# Patient Record
Sex: FEMALE | Race: WHITE | NOT HISPANIC OR LATINO | Employment: OTHER | URBAN - METROPOLITAN AREA
[De-identification: names, ages, dates, MRNs, and addresses within clinical notes are randomized per-mention and may not be internally consistent; named-entity substitution may affect disease eponyms.]

---

## 2017-05-09 ENCOUNTER — LAB REQUISITION (OUTPATIENT)
Dept: LAB | Facility: HOSPITAL | Age: 82
End: 2017-05-09
Payer: COMMERCIAL

## 2017-05-09 DIAGNOSIS — L03.221 CELLULITIS OF NECK: ICD-10-CM

## 2017-05-09 PROCEDURE — 88173 CYTOPATH EVAL FNA REPORT: CPT | Performed by: OTOLARYNGOLOGY

## 2017-05-09 PROCEDURE — 87076 CULTURE ANAEROBE IDENT EACH: CPT | Performed by: OTOLARYNGOLOGY

## 2017-05-09 PROCEDURE — 88305 TISSUE EXAM BY PATHOLOGIST: CPT | Performed by: OTOLARYNGOLOGY

## 2017-05-09 PROCEDURE — 87070 CULTURE OTHR SPECIMN AEROBIC: CPT | Performed by: OTOLARYNGOLOGY

## 2017-05-09 PROCEDURE — 88172 CYTP DX EVAL FNA 1ST EA SITE: CPT | Performed by: OTOLARYNGOLOGY

## 2017-05-09 PROCEDURE — 87075 CULTR BACTERIA EXCEPT BLOOD: CPT | Performed by: OTOLARYNGOLOGY

## 2017-05-09 PROCEDURE — 87185 SC STD ENZYME DETCJ PER NZM: CPT | Performed by: OTOLARYNGOLOGY

## 2017-05-09 PROCEDURE — 87147 CULTURE TYPE IMMUNOLOGIC: CPT | Performed by: OTOLARYNGOLOGY

## 2017-05-09 PROCEDURE — 87205 SMEAR GRAM STAIN: CPT | Performed by: OTOLARYNGOLOGY

## 2017-05-10 ENCOUNTER — LAB REQUISITION (OUTPATIENT)
Dept: LAB | Facility: HOSPITAL | Age: 82
End: 2017-05-10
Payer: COMMERCIAL

## 2017-05-10 DIAGNOSIS — L03.221 CELLULITIS OF NECK: ICD-10-CM

## 2017-05-15 LAB
BACTERIA WND AEROBE CULT: NORMAL
BACTERIA WND AEROBE CULT: NORMAL
GRAM STN SPEC: NORMAL

## 2017-05-19 LAB
BACTERIA SPEC ANAEROBE CULT: NORMAL

## 2018-05-15 ENCOUNTER — TRANSCRIBE ORDERS (OUTPATIENT)
Dept: OBGYN CLINIC | Facility: MEDICAL CENTER | Age: 83
End: 2018-05-15

## 2018-05-16 ENCOUNTER — OFFICE VISIT (OUTPATIENT)
Dept: OBGYN CLINIC | Facility: MEDICAL CENTER | Age: 83
End: 2018-05-16
Payer: COMMERCIAL

## 2018-05-16 ENCOUNTER — APPOINTMENT (OUTPATIENT)
Dept: RADIOLOGY | Facility: CLINIC | Age: 83
End: 2018-05-16
Payer: COMMERCIAL

## 2018-05-16 VITALS
DIASTOLIC BLOOD PRESSURE: 81 MMHG | SYSTOLIC BLOOD PRESSURE: 124 MMHG | HEART RATE: 74 BPM | HEIGHT: 59 IN | BODY MASS INDEX: 30.52 KG/M2 | WEIGHT: 151.4 LBS

## 2018-05-16 DIAGNOSIS — M17.12 OSTEOARTHRITIS OF LEFT KNEE, UNSPECIFIED OSTEOARTHRITIS TYPE: Primary | ICD-10-CM

## 2018-05-16 DIAGNOSIS — M25.562 LEFT KNEE PAIN, UNSPECIFIED CHRONICITY: ICD-10-CM

## 2018-05-16 DIAGNOSIS — Z01.89 ENCOUNTER FOR LOWER EXTREMITY COMPARISON IMAGING STUDY: ICD-10-CM

## 2018-05-16 PROCEDURE — 73564 X-RAY EXAM KNEE 4 OR MORE: CPT

## 2018-05-16 PROCEDURE — 73560 X-RAY EXAM OF KNEE 1 OR 2: CPT

## 2018-05-16 PROCEDURE — 99203 OFFICE O/P NEW LOW 30 MIN: CPT | Performed by: ORTHOPAEDIC SURGERY

## 2018-05-16 PROCEDURE — 20610 DRAIN/INJ JOINT/BURSA W/O US: CPT | Performed by: ORTHOPAEDIC SURGERY

## 2018-05-16 RX ADMIN — BUPIVACAINE HYDROCHLORIDE 2 ML: 2.5 INJECTION, SOLUTION INFILTRATION; PERINEURAL at 13:03

## 2018-05-16 RX ADMIN — METHYLPREDNISOLONE ACETATE 2 ML: 40 INJECTION, SUSPENSION INTRA-ARTICULAR; INTRALESIONAL; INTRAMUSCULAR; SOFT TISSUE at 13:03

## 2018-05-16 NOTE — PROGRESS NOTES
Assessment/Plan     1  Osteoarthritis of left knee, unspecified osteoarthritis type    2  Left knee pain, unspecified chronicity    3  Encounter for lower extremity comparison imaging study      Orders Placed This Encounter   Procedures    Large joint arthrocentesis    Medial  Knee Brace    XR knee 4+ vw left injury    XR knee 1 or 2 vw right    Ambulatory Referral to Home Health       81year old female  with severe left knee osteoarthritis  Explained to the pt that arthroplasty may be a future treatment option for her, however at this time more conservative treatment would be preferred which she agrees with  She received a CSI in the left knee which she tolerated well with no immediate complication  She was also offered a referral for home PT, voltaren gel to use as needed, and a knee brace to wear for comfort  She will follow up in six weeks or as needed  Return in about 6 weeks (around 6/27/2018)  I answered all of the patient's questions during the visit and provided education of the patient's condition during the visit  The patient verbalized understanding of the information given and agrees with the plan  This note was dictated using [employer] software  It may contain errors including improperly dictated words  Please contact physician directly for any questions  History of Present Illness   Chief complaint:   Chief Complaint   Patient presents with    Left Knee - Pain       HPI: Alvera Najjar is a 81 y o  female that c/o left knee pain  Pain in her left knee has been chronic for over 10 years, getting worse over past 5 years, especially the last year  She reports having injections in this knee over ten years ago in [place], can't remember whether they were HA or CSI  Pain is 8-9/10, located on anteromedial knee, it is burning and sharp,intermittent at times, other times constant   Pain does not radiate Worse with walking and being on her feet, better with tylenol (can't take NSAIDs due to being on eliquis)  She denies any surgeries in this knee, denies any significant injuries that she is aware of, no numbness or tingling  She does note some swelling in the left knee  ROS:    See HPI for musculoskeletal review  All other systems reviewed are negative     Historical Information   Past Medical History:   Diagnosis Date    Hypertension      Past Surgical History:   Procedure Laterality Date    FOOT SURGERY      SKIN CANCER EXCISION       Social History   History   Alcohol use Not on file     History   Drug use: Unknown     History   Smoking Status    Never Smoker   Smokeless Tobacco    Never Used     Family History:   Family History   Problem Relation Age of Onset    No Known Problems Mother     No Known Problems Father        Meds/Allergies     (Not in a hospital admission)  No Known Allergies    No current outpatient prescriptions on file prior to visit  No current facility-administered medications on file prior to visit  Objective   Vitals: Blood pressure 124/81, pulse 74, height 4' 11" (1 499 m), weight 68 7 kg (151 lb 6 4 oz)  ,Body mass index is 30 58 kg/m²      PE:  AAOx 3  WDWN  Hearing intact, no drainage from eyes  Regular rate  no audible wheezing  no abdominal distension  LE compartments soft, skin intact  EHL/AT/GS intact, sensation grossly intact L4, L5, S1, palpable pedal pulse    left knee:    Appearance:  No bruising  Knee has mild varus deformity  Minimal effusion, some diffuse extraarticular swelling  Palpation/Tenderness:  +TTP over medial joint line, no TTP over lateral joint line or over patella/patellar tendon  Active Range of Motion:  AROM: full  Special Tests:  Lachman's Test:  negative  Anterior Drawer Test:  negative  Valgus Stress Test:  negative  Varus Stress Test:  negative     No ipsilateral hip pain with ROM      Imaging Studies: I have personally reviewed pertinent films in PACS   XR L knee:  Severe DJD    Large joint arthrocentesis  Date/Time: 5/16/2018 1:03 PM  Consent given by: patient  Site marked: site marked  Timeout: Immediately prior to procedure a time out was called to verify the correct patient, procedure, equipment, support staff and site/side marked as required   Supporting Documentation  Indications: pain   Procedure Details  Location: knee - L knee  Preparation: Patient was prepped and draped in the usual sterile fashion  Needle size: 22 G  Ultrasound guidance: no  Approach: anterolateral  Medications administered: 2 mL bupivacaine 0 25 %; 2 mL methylPREDNISolone acetate 40 mg/mL    Patient tolerance: patient tolerated the procedure well with no immediate complications  Dressing:  Sterile dressing applied

## 2018-05-17 RX ORDER — BUPIVACAINE HYDROCHLORIDE 2.5 MG/ML
2 INJECTION, SOLUTION INFILTRATION; PERINEURAL
Status: COMPLETED | OUTPATIENT
Start: 2018-05-16 | End: 2018-05-16

## 2018-05-17 RX ORDER — METHYLPREDNISOLONE ACETATE 40 MG/ML
2 INJECTION, SUSPENSION INTRA-ARTICULAR; INTRALESIONAL; INTRAMUSCULAR; SOFT TISSUE
Status: COMPLETED | OUTPATIENT
Start: 2018-05-16 | End: 2018-05-16

## 2018-05-18 ENCOUNTER — TELEPHONE (OUTPATIENT)
Dept: OBGYN CLINIC | Facility: HOSPITAL | Age: 83
End: 2018-05-18

## 2018-05-18 NOTE — TELEPHONE ENCOUNTER
I called and spoke to the patient  She states she is all set up for outpatient physical therapy  She does not need a script her any help than this up

## 2018-05-18 NOTE — TELEPHONE ENCOUNTER
phillip called from  PT  She states that the pt will be starting outpatient PT on 05/21/18 and not using their services   She can be reached at [phone]

## 2018-05-21 ENCOUNTER — EVALUATION (OUTPATIENT)
Dept: PHYSICAL THERAPY | Facility: CLINIC | Age: 83
End: 2018-05-21
Payer: COMMERCIAL

## 2018-05-21 DIAGNOSIS — M17.12 OSTEOARTHRITIS OF LEFT KNEE, UNSPECIFIED OSTEOARTHRITIS TYPE: ICD-10-CM

## 2018-05-21 DIAGNOSIS — M25.562 LEFT KNEE PAIN, UNSPECIFIED CHRONICITY: Primary | ICD-10-CM

## 2018-05-21 PROCEDURE — 97161 PT EVAL LOW COMPLEX 20 MIN: CPT

## 2018-05-21 PROCEDURE — G8979 MOBILITY GOAL STATUS: HCPCS

## 2018-05-21 PROCEDURE — 97110 THERAPEUTIC EXERCISES: CPT

## 2018-05-21 PROCEDURE — G8978 MOBILITY CURRENT STATUS: HCPCS

## 2018-05-21 NOTE — PROGRESS NOTES
PT Evaluation     Today's date: 2018  Patient name: Apple Vogel  : 1935  MRN: [id number]  Referring provider: Ny Mercer DO  Dx:   Encounter Diagnosis     ICD-10-CM    1  Left knee pain, unspecified chronicity M25 562    2  Osteoarthritis of left knee, unspecified osteoarthritis type M17 12                   Assessment  Impairments: abnormal gait, abnormal or restricted ROM, impaired balance, impaired physical strength, lacks appropriate home exercise program and pain with function    Assessment details: Apple Vogel is a 81 y o  female presents with L knee pain, OA  Pantera Deleon has the above listed impairments and will benefit from skilled PT to improve deficits to return to prior level of function  Apple Vogel was educated on eval findings and plan for management, knee anatomy, safe ice  HEP initiated  Romelia Cramer would benefit from skilled services to improve ROM, strength, flexibility, and function, and to decrease pain  Understanding of Dx/Px/POC: good   Prognosis: good    Goals  Impairment Goals  - Pt I with initial HEP in 1-2 visits  - Improve ROM equal to contralateral side in 4-6 weeks  - Increase strength to 5/5 in all affected areas in 4-6 week    Functional Goals  - Increase Functional Status Measure to: 56 in 4-6  weeks  - Patient will be independent with comprehensive HEP in 4-6 weeks  - Ambulation non-antalgic in 4-6 weeks  - LLE dynamic strength and balance at least 90% of RLE via single leg step test, in 4-6 wks        Plan  Patient would benefit from: skilled PT  Planned modality interventions: cryotherapy  Planned therapy interventions: patient education, stretching, therapeutic exercise, home exercise program, balance and strengthening  Frequency: 2x week  Duration in visits: 8  Duration in weeks: 4  Treatment plan discussed with: patient        Subjective Evaluation    History of Present Illness  Mechanism of injury: Pt reports haven fallen 4 times over 3 year, always landing on L knee  Tripped on objects or fell on ice identified as causes of falls  Reports chronic knee pain for several years  Received x-rays, dx with OA  Functional limitations: "I'm fine when I get up in the morning, but it gets worse as the day goes on "  Pt cares for horses, "I'm not one to sit "  Does steps in reciprocal pattern for a long time  Unable to do reciprocal pattern due to L knee pain  Pt reports that ortho has ordered her a knee brace, yet to arrive  Pain  Current pain ratin  At best pain ratin  At worst pain ratin  Location:  knee medial aspect  Quality: burning  Relieving factors: rest, ice and medications  Aggravating factors: standing, walking and stair climbing  Progression: improved (Improved since injection)    Social Support  Steps to enter house: yes  1  Stairs in house: no   Lives in: [facility]  Lives with: adult children    Employment status: not working (Retired)    Diagnostic Tests  X-ray: abnormal (OA)  Treatments  Previous treatment: injection treatment and medication  Current treatment: medication  Patient Goals  Patient goals for therapy: decreased pain, increased strength, increased motion, independence with ADLs/IADLs, return to sport/leisure activities and improved balance          Objective    Gait: no AD, mildly antalgic LLE, steady pattern    TUG 11 13 sec , no AD  Observation: L > R genu varum  B knees without erythema or ecchymosis  Mild to moderate edema B     OH squat: Fair balance, slightly increased trunk flexion at hips  Palpation: L knee TTP along medial joint line  B calves non-tender  B patellar mobility normal     Knee A/PROM:    R: 0-116 deg  L: 0-98 deg, pain at end range flexion AROM, PROM not tested  MMT: Quads: R 5/5, L 4/5 with crepitus  Hamstrings 5/5 B  Glute medius R 5/5, L 4-/5  Glute max B 3+/5  B ant tib 5/5  Flexibility: B gastroc, R hip flexors moderately tight    L hip flexors normal   B hamstrings normal   Priscilla's: R (+), L (-)      Precautions: PMH PE, HTN, skin CA    Daily Treatment Diary       Manuals 5/21/ 18                                                                Exercise Diary              Heel slide with strap 10"x 10            Strap gastroc str 20" x5            QS 10"x 10            SLR  x10            SAQ             TKE with tband             LAQ             Nu Step             TA Cx             Bridge             Clamshell L                                                                                                                                                            Modalities             Ice

## 2018-05-23 ENCOUNTER — TELEPHONE (OUTPATIENT)
Dept: OBGYN CLINIC | Facility: HOSPITAL | Age: 83
End: 2018-05-23

## 2018-05-23 NOTE — TELEPHONE ENCOUNTER
Patient is calling   [phone]  Patient sees Dr Melodie Beal    Patient is calling because the pharmacy has not been able to get approval for diclofenac sodium to date  Patient would like to know if there is anything else that she could use instead?

## 2018-05-24 ENCOUNTER — OFFICE VISIT (OUTPATIENT)
Dept: PHYSICAL THERAPY | Facility: CLINIC | Age: 83
End: 2018-05-24
Payer: COMMERCIAL

## 2018-05-24 DIAGNOSIS — M25.562 LEFT KNEE PAIN, UNSPECIFIED CHRONICITY: Primary | ICD-10-CM

## 2018-05-24 DIAGNOSIS — M17.12 OSTEOARTHRITIS OF LEFT KNEE, UNSPECIFIED OSTEOARTHRITIS TYPE: ICD-10-CM

## 2018-05-24 PROCEDURE — 97110 THERAPEUTIC EXERCISES: CPT

## 2018-05-24 NOTE — PROGRESS NOTES
Daily Note     Today's date: 2018  Patient name: Denise Reyes  : 1935  MRN: [id number]  Referring provider: Dom Torres DO  Dx:   Encounter Diagnosis     ICD-10-CM    1  Left knee pain, unspecified chronicity M25 562    2  Osteoarthritis of left knee, unspecified osteoarthritis type M17 12                   Subjective: "Doing a little bit better "  L knee pain 0/10  Objective: See treatment diary below    Precautions: PMH PE, HTN, skin CA    Daily Treatment Diary       Manuals                                                                Exercise Diary              Heel slide with strap 10"x 10 x20           Strap gastroc str 20" x5 x5           QS 10"x 10 x20           SLR  x10 2x10           SAQ  2x10           TKE with tband  blk 2x10           LAQ  2x10           Nu Step  8'           TA Cx  10"x 10           Bridge  10"x 10           Clamshell L  10"x 10                                                                                                                                                          Modalities             Ice                                        Assessment: Tolerated treatment well  Patient would benefit from continued PT  Pain free to end tx, HEP progressed  Plan: Continue per plan of care

## 2018-05-24 NOTE — TELEPHONE ENCOUNTER
I talked to the patient today and let her know to use otc medication if she can't get it at the pharmacy

## 2018-05-30 ENCOUNTER — OFFICE VISIT (OUTPATIENT)
Dept: PHYSICAL THERAPY | Facility: CLINIC | Age: 83
End: 2018-05-30
Payer: COMMERCIAL

## 2018-05-30 DIAGNOSIS — M25.562 LEFT KNEE PAIN, UNSPECIFIED CHRONICITY: Primary | ICD-10-CM

## 2018-05-30 DIAGNOSIS — M17.12 OSTEOARTHRITIS OF LEFT KNEE, UNSPECIFIED OSTEOARTHRITIS TYPE: ICD-10-CM

## 2018-05-30 PROCEDURE — 97110 THERAPEUTIC EXERCISES: CPT

## 2018-05-30 NOTE — PROGRESS NOTES
Daily Note     Today's date: 2018  Patient name: Favio Banuelos  : 1935  MRN: [id number]  Referring provider: Miriam To DO  Dx:   Encounter Diagnosis     ICD-10-CM    1  Left knee pain, unspecified chronicity M25 562    2  Osteoarthritis of left knee, unspecified osteoarthritis type M17 12                   Subjective: received L knee brace, "I like it "  L knee pain currently 0/10  Objective: See treatment diary below  To PT with hinged L knee brace  Precautions: PMH PE, HTN, skin CA    Daily Treatment Diary       Manuals                                                               Exercise Diary              Heel slide with strap 10"x 10 x20 30          Strap gastroc str 20" x5 x5 5          QS 10"x 10 x20 -          SLR  x10 2x10 3x10          SAQ  2x10 2# 2x10          TKE with tband  blk 2x10 3x10          LAQ  2x10 2# 2x10          Nu Step  8' 10'          TA Cx  10"x 10 -          Bridge  10"x 10 15          Clamshell L  10"x 10 15                                                                                                                                                         Modalities             Ice                                          Assessment: Tolerated treatment well  Patient would benefit from continued PT      Plan: Continue per plan of care

## 2018-05-31 ENCOUNTER — TELEPHONE (OUTPATIENT)
Dept: OBGYN CLINIC | Facility: HOSPITAL | Age: 83
End: 2018-05-31

## 2018-06-01 ENCOUNTER — OFFICE VISIT (OUTPATIENT)
Dept: PHYSICAL THERAPY | Facility: CLINIC | Age: 83
End: 2018-06-01
Payer: COMMERCIAL

## 2018-06-01 DIAGNOSIS — M17.12 OSTEOARTHRITIS OF LEFT KNEE, UNSPECIFIED OSTEOARTHRITIS TYPE: ICD-10-CM

## 2018-06-01 DIAGNOSIS — M25.562 LEFT KNEE PAIN, UNSPECIFIED CHRONICITY: Primary | ICD-10-CM

## 2018-06-01 PROCEDURE — 97110 THERAPEUTIC EXERCISES: CPT

## 2018-06-01 NOTE — PROGRESS NOTES
Daily Note     Today's date: 2018  Patient name: Favio Banuelos  : 1935  MRN: [id number]  Referring provider: Miriam To DO  Dx:   Encounter Diagnosis     ICD-10-CM    1  Left knee pain, unspecified chronicity M25 562    2  Osteoarthritis of left knee, unspecified osteoarthritis type M17 12                   Subjective: "I overdid it yesterday  I wanted to get my tomatoes in "  L knee pain 7/10 today after gardening yesterday  Objective: See treatment diary below    Precautions: PMH PE, HTN, skin CA    Daily Treatment Diary       Manuals                                                              Exercise Diary              Heel slide with strap 10"x 10 x20 30 30         Strap gastroc str 20" x5 x5 5 5         QS 10"x 10 x20 -          SLR  x10 2x10 3x10 3x10         SAQ  2x10 2# 2x10 3x10         TKE with tband  blk 2x10 3x10 3x10         LAQ  2x10 2# 2x10 3x10         Nu Step  8' 10' 10'         TA Cx  10"x 10 -          Bridge  10"x 10 15 20         Clamshell L  10"x 10 15 20                                                                                                                                                        Modalities             Ice                                          Assessment: Tolerated treatment well  Patient would benefit from continued PT      Plan: Continue per plan of care

## 2018-06-05 ENCOUNTER — OFFICE VISIT (OUTPATIENT)
Dept: PHYSICAL THERAPY | Facility: CLINIC | Age: 83
End: 2018-06-05
Payer: COMMERCIAL

## 2018-06-05 DIAGNOSIS — M17.12 OSTEOARTHRITIS OF LEFT KNEE, UNSPECIFIED OSTEOARTHRITIS TYPE: ICD-10-CM

## 2018-06-05 DIAGNOSIS — M25.562 LEFT KNEE PAIN, UNSPECIFIED CHRONICITY: Primary | ICD-10-CM

## 2018-06-05 PROCEDURE — 97110 THERAPEUTIC EXERCISES: CPT

## 2018-06-05 NOTE — PROGRESS NOTES
Daily Note     Today's date: 2018  Patient name: Alberta Phillip  : 1935  MRN: [id number]  Referring provider: Yazmin Ryan DO  Dx:   Encounter Diagnosis     ICD-10-CM    1  Left knee pain, unspecified chronicity M25 562    2  Osteoarthritis of left knee, unspecified osteoarthritis type M17 12                   Subjective: "The knee feels better, I don't have any pain right now "      Objective: See treatment diary below    Precautions: PMH PE, HTN, skin CA    Daily Treatment Diary       Manuals                                                             Exercise Diary              Heel slide with strap 10"x 10 x20 30 30 -        Strap gastroc str 20" x5 x5 5 5 Wall x5        QS 10"x 10 x20 -          SLR  x10 2x10 3x10 3x10 1# 2x10        SAQ  2x10 2# 2x10 3x10 3# 2x10        TKE with tband  blk 2x10 3x10 3x10 3x10        LAQ  2x10 2# 2x10 3x10 3# 2x10        Nu Step  8' 10' 10' 10'        TA Cx  10"x 10 -          Bridge  10"x 10 15 20         Clamshell L  10"x 10 15 20                                                                                                                                                        Modalities             Ice                                            Assessment: Tolerated treatment well  Patient would benefit from continued PT  HEP progressed  Plan: Continue per plan of care

## 2018-06-07 ENCOUNTER — OFFICE VISIT (OUTPATIENT)
Dept: PHYSICAL THERAPY | Facility: CLINIC | Age: 83
End: 2018-06-07
Payer: COMMERCIAL

## 2018-06-07 DIAGNOSIS — M25.562 LEFT KNEE PAIN, UNSPECIFIED CHRONICITY: Primary | ICD-10-CM

## 2018-06-07 DIAGNOSIS — M17.12 OSTEOARTHRITIS OF LEFT KNEE, UNSPECIFIED OSTEOARTHRITIS TYPE: ICD-10-CM

## 2018-06-07 PROCEDURE — 97110 THERAPEUTIC EXERCISES: CPT

## 2018-06-07 NOTE — PROGRESS NOTES
Daily Note     Today's date: 2018  Patient name: Daniel Weems  : 1935  MRN: [id number]  Referring provider: Zander Garcia DO  Dx:   Encounter Diagnosis     ICD-10-CM    1  Left knee pain, unspecified chronicity M25 562    2  Osteoarthritis of left knee, unspecified osteoarthritis type M17 12                   Subjective: L knee pain 4/10, "I cut way back on Tylenol "      Objective: See treatment diary below    Precautions: PMH PE, HTN, skin CA    Daily Treatment Diary       Manuals                                                            Exercise Diary              Heel slide with strap 10"x 10 x20 30 30 -        Strap gastroc str 20" x5 x5 5 5 Wall x5 x5       QS 10"x 10 x20 -          SLR  x10 2x10 3x10 3x10 1# 2x10 3x10       SAQ  2x10 2# 2x10 3x10 3# 2x10 3x10       TKE with tband  blk 2x10 3x10 3x10 3x10 Silver 2x10       LAQ  2x10 2# 2x10 3x10 3# 2x10 3x10       Nu Step  8' 10' 10' 10' 10'       TA Cx  10"x 10 -          Bridge  10"x 10 15 20  20       Clamshell L  10"x 10 15 20  20                                                                                                                                                      Modalities             Ice                                          Assessment: Tolerated treatment well  Patient exhibited good technique with therapeutic exercises  Therex progressed  Plan: Continue per plan of care

## 2018-06-11 ENCOUNTER — OFFICE VISIT (OUTPATIENT)
Dept: PHYSICAL THERAPY | Facility: CLINIC | Age: 83
End: 2018-06-11
Payer: COMMERCIAL

## 2018-06-11 DIAGNOSIS — M25.562 LEFT KNEE PAIN, UNSPECIFIED CHRONICITY: Primary | ICD-10-CM

## 2018-06-11 DIAGNOSIS — M17.12 OSTEOARTHRITIS OF LEFT KNEE, UNSPECIFIED OSTEOARTHRITIS TYPE: ICD-10-CM

## 2018-06-11 PROCEDURE — 97110 THERAPEUTIC EXERCISES: CPT

## 2018-06-11 NOTE — PROGRESS NOTES
Daily Note     Today's date: 2018  Patient name: Lul Waite  : 1935  MRN: [id number]  Referring provider: Luisito Cabral DO  Dx:   Encounter Diagnosis     ICD-10-CM    1  Left knee pain, unspecified chronicity M25 562    2  Osteoarthritis of left knee, unspecified osteoarthritis type M17 12                   Subjective: L knee pain 4/10, "I was standing a lot yesterday  Of course this weather doesn't help "      Objective: See treatment diary below    Precautions: PMH PE, HTN, skin CA    Daily Treatment Diary       Manuals                                                           Exercise Diary              Heel slide with strap 10"x 10 x20 30 30 -        Strap gastroc str 20" x5 x5 5 5 Wall x5 x5 x5      QS 10"x 10 x20 -          SLR  x10 2x10 3x10 3x10 1# 2x10 3x10 3x10      SAQ  2x10 2# 2x10 3x10 3# 2x10 3x10 3x10      TKE with tband  blk 2x10 3x10 3x10 3x10 Silver 2x10 3x10      LAQ  2x10 2# 2x10 3x10 3# 2x10 3x10 3x10      Nu Step  8' 10' 10' 10' 10' 10'      TA Cx  10"x 10 -          Bridge  10"x 10 15 20  20 25      Clamshell L  10"x 10 15 20  20 25                                                                                                                                                     Modalities             Ice                                  Assessment: Tolerated treatment well  Patient exhibited good technique with therapeutic exercises      Plan: Continue per plan of care

## 2018-06-12 ENCOUNTER — APPOINTMENT (OUTPATIENT)
Dept: PHYSICAL THERAPY | Facility: CLINIC | Age: 83
End: 2018-06-12
Payer: COMMERCIAL

## 2018-06-14 ENCOUNTER — OFFICE VISIT (OUTPATIENT)
Dept: PHYSICAL THERAPY | Facility: CLINIC | Age: 83
End: 2018-06-14
Payer: COMMERCIAL

## 2018-06-14 DIAGNOSIS — M17.12 OSTEOARTHRITIS OF LEFT KNEE, UNSPECIFIED OSTEOARTHRITIS TYPE: ICD-10-CM

## 2018-06-14 DIAGNOSIS — M25.562 LEFT KNEE PAIN, UNSPECIFIED CHRONICITY: Primary | ICD-10-CM

## 2018-06-14 PROCEDURE — 97110 THERAPEUTIC EXERCISES: CPT

## 2018-06-14 NOTE — PROGRESS NOTES
Daily Note     Today's date: 2018  Patient name: Rosalva Escalante  : 1935  MRN: [id number]  Referring provider: Bee Marie DO  Dx:   Encounter Diagnosis     ICD-10-CM    1  Left knee pain, unspecified chronicity M25 562    2  Osteoarthritis of left knee, unspecified osteoarthritis type M17 12                   Subjective: "Yesterday both knees were bothering me something terrible  Today they don't hurt, I don't know why "      Objective: See treatment diary below      Precautions: PMH PE, HTN, skin CA    Daily Treatment Diary       Manuals                                                          Exercise Diary              Heel slide with strap 10"x 10 x20 30 30 -        Strap gastroc str 20" x5 x5 5 5 Wall x5 x5 x5 x5     QS 10"x 10 x20 -          SLR  x10 2x10 3x10 3x10 1# 2x10 3x10 3x10 3x10     SAQ  2x10 2# 2x10 3x10 3# 2x10 3x10 3x10 3x10     TKE with tband  blk 2x10 3x10 3x10 3x10 Silver 2x10 3x10 3x10     LAQ  2x10 2# 2x10 3x10 3# 2x10 3x10 3x10 3x10     Nu Step  8' 10' 10' 10' 10' 10' 10'     TA Cx  10"x 10 -          Bridge  10"x 10 15 20  20 25 30     Clamshell L  10"x 10 15 20  20 25 30                                                                                                                                                    Modalities             Ice                                    Assessment: Tolerated treatment well  Patient exhibited good technique with therapeutic exercises and would benefit from continued PT      Plan: Continue per plan of care

## 2018-06-18 ENCOUNTER — APPOINTMENT (OUTPATIENT)
Dept: PHYSICAL THERAPY | Facility: CLINIC | Age: 83
End: 2018-06-18
Payer: COMMERCIAL

## 2018-06-19 ENCOUNTER — EVALUATION (OUTPATIENT)
Dept: PHYSICAL THERAPY | Facility: CLINIC | Age: 83
End: 2018-06-19
Payer: COMMERCIAL

## 2018-06-19 DIAGNOSIS — M17.12 OSTEOARTHRITIS OF LEFT KNEE, UNSPECIFIED OSTEOARTHRITIS TYPE: ICD-10-CM

## 2018-06-19 DIAGNOSIS — M25.562 LEFT KNEE PAIN, UNSPECIFIED CHRONICITY: Primary | ICD-10-CM

## 2018-06-19 PROCEDURE — G8979 MOBILITY GOAL STATUS: HCPCS

## 2018-06-19 PROCEDURE — 97164 PT RE-EVAL EST PLAN CARE: CPT

## 2018-06-19 PROCEDURE — G8980 MOBILITY D/C STATUS: HCPCS

## 2018-06-19 PROCEDURE — 97110 THERAPEUTIC EXERCISES: CPT

## 2018-06-19 NOTE — PROGRESS NOTES
PT Discharge    Today's date: 2018  Patient name: Jesse Franco  : 1935  MRN: [id number]  Referring provider: Mario Alberto Lundberg DO  Dx:   Encounter Diagnosis     ICD-10-CM    1  Left knee pain, unspecified chronicity M25 562    2  Osteoarthritis of left knee, unspecified osteoarthritis type M17 12                   Subjective: "The pain comes and goes "  L knee pain today 0/10  Pt independent with HEP for self management, agreeable to D/C to HEP today  Objective: See treatment diary below    Pain: L knee 0-410  Gait: no AD, non-antalgic  TUG: no AD, 10 00 sec  MMT L quads 5/5, glute medius 5/5, glute max 4/5  Knee A/PROM: L A: 1-0-113 deg, P: 2-0-113 deg  Steps: step-to pattern with rail  Dynamic strength and balance LLE 74% of RLE via single leg step test     Precautions: PMH PE, HTN, skin CA    Daily Treatment Diary       Manuals                                                         Exercise Diary              Heel slide with strap 10"x 10 x20 30 30 -        Strap gastroc str 20" x5 x5 5 5 Wall x5 x5 x5 x5 x5    QS 10"x 10 x20 -          SLR  x10 2x10 3x10 3x10 1# 2x10 3x10 3x10 3x10     SAQ  2x10 2# 2x10 3x10 3# 2x10 3x10 3x10 3x10     TKE with tband  blk 2x10 3x10 3x10 3x10 Silver 2x10 3x10 3x10 3x10    LAQ  2x10 2# 2x10 3x10 3# 2x10 3x10 3x10 3x10 3x10    Nu Step  8' 10' 10' 10' 10' 10' 10' 10'    TA Cx  10"x 10 -          Bridge  10"x 10 15 20  20 25 30 L 10"x 10    Clamshell L  10"x 10 15 20  20 25 30                                                                                                                                                    Modalities             Ice                                        Assessment: Tolerated treatment well  Patient exhibited good technique with therapeutic exercises      Jesse Franco has been compliant with attending PT and home exercise program since initial iris Sosa  has made improvements in objective data since initial evalulation  It was mutually agreed to Discharge to home exercise program at this time  Pt attended 9 visits, missed 0  Goals  Impairment Goals  - Pt I with initial HEP in 1-2 visits - met  - Improve ROM equal to contralateral side in 4-6 weeks- not met, but WFL  - Increase strength to 5/5 in all affected areas in 4-6 week - met except glute max    Functional Goals  - Increase Functional Status Measure to: 56 in 4-6  weeks - not met  - Patient will be independent with comprehensive HEP in 4-6 weeks - met  - Ambulation non-antalgic in 4-6 weeks - met  - LLE dynamic strength and balance at least 90% of RLE via single leg step test, in 4-6 wks  - not met        Plan: D/C to HEP  PT included therex, pt education, HEP, gait training

## 2018-06-20 ENCOUNTER — OFFICE VISIT (OUTPATIENT)
Dept: OBGYN CLINIC | Facility: MEDICAL CENTER | Age: 83
End: 2018-06-20
Payer: COMMERCIAL

## 2018-06-20 VITALS
SYSTOLIC BLOOD PRESSURE: 140 MMHG | DIASTOLIC BLOOD PRESSURE: 78 MMHG | HEIGHT: 59 IN | HEART RATE: 60 BPM | BODY MASS INDEX: 30 KG/M2 | WEIGHT: 148.8 LBS

## 2018-06-20 DIAGNOSIS — M17.12 PRIMARY OSTEOARTHRITIS OF LEFT KNEE: Primary | ICD-10-CM

## 2018-06-20 PROCEDURE — 99213 OFFICE O/P EST LOW 20 MIN: CPT | Performed by: ORTHOPAEDIC SURGERY

## 2018-06-20 NOTE — PROGRESS NOTES
Assessment/Plan:  1  Primary osteoarthritis of left knee      Orders Placed This Encounter   Procedures    Injection procedure prior authorization     -we will obtain Synvisc for her left knee  -we will continue with home exercises  -she will continue with Tylenol as needed for pain control  -she will continue with her knee brace as needed for comfort  Return for after synvisc   Subjective   Chief Complaint:   Chief Complaint   Patient presents with    Left Knee - Follow-up       Alberta Phillip is a 81 y o  female who presents for follow up for left knee pain  She had a left knee steroid injection on 5/16/2018  She states that helped for about a week or so  She still is having pain that is  medial and lateral   The pain comes and goes  She states the pain is worse with standing  Her knee does feel unstable at times  Physical therapy has been helping her  She was given home exercises to do on her own  She does have a knee brace which does seem to help  Review of Systems  ROS:    See HPI for musculoskeletal review     All other systems reviewed are negative     History:  Past Medical History:   Diagnosis Date    Hypertension      Past Surgical History:   Procedure Laterality Date    FOOT SURGERY      SKIN CANCER EXCISION       Social History   History   Alcohol use Not on file     History   Drug use: Unknown     History   Smoking Status    Never Smoker   Smokeless Tobacco    Never Used     Family History:   Family History   Problem Relation Age of Onset    No Known Problems Mother     No Known Problems Father        Meds/Allergies     (Not in a hospital admission)  No Known Allergies       Objective     /78   Pulse 60   Ht 4' 11" (1 499 m)   Wt 67 5 kg (148 lb 12 8 oz)   BMI 30 05 kg/m²      PE:  AAOx 3  WDWN  Hearing intact, no drainage from eyes  no audible wheezing  no abdominal distension  LE compartments soft, skin intact    Ortho Exam:  left Knee:   No erythema  no swelling  no effusion  no warmth  AROM: full  Stable to varus/valgus stress

## 2018-06-21 ENCOUNTER — APPOINTMENT (OUTPATIENT)
Dept: PHYSICAL THERAPY | Facility: CLINIC | Age: 83
End: 2018-06-21
Payer: COMMERCIAL

## 2018-06-25 ENCOUNTER — APPOINTMENT (OUTPATIENT)
Dept: PHYSICAL THERAPY | Facility: CLINIC | Age: 83
End: 2018-06-25
Payer: COMMERCIAL

## 2018-06-25 NOTE — TELEPHONE ENCOUNTER
Prior Saad Kruger has been submitted to plan via the prior auth website  Will take about 24hrs for a response by insurance  If pt calls again, case will be pending and will be contacted as soon as a response is received

## 2018-06-27 ENCOUNTER — APPOINTMENT (OUTPATIENT)
Dept: PHYSICAL THERAPY | Facility: CLINIC | Age: 83
End: 2018-06-27
Payer: COMMERCIAL

## 2018-07-30 ENCOUNTER — TELEPHONE (OUTPATIENT)
Dept: OBGYN CLINIC | Facility: MEDICAL CENTER | Age: 83
End: 2018-07-30

## 2018-07-30 NOTE — TELEPHONE ENCOUNTER
Call from patient   Call back # [phone]  Dr Cleaning Abts     Patient is requesting an update on Synvisc for the left knee

## 2018-08-03 ENCOUNTER — OFFICE VISIT (OUTPATIENT)
Dept: OBGYN CLINIC | Facility: MEDICAL CENTER | Age: 83
End: 2018-08-03
Payer: COMMERCIAL

## 2018-08-03 VITALS
HEART RATE: 68 BPM | BODY MASS INDEX: 30.04 KG/M2 | DIASTOLIC BLOOD PRESSURE: 76 MMHG | WEIGHT: 149 LBS | HEIGHT: 59 IN | SYSTOLIC BLOOD PRESSURE: 127 MMHG

## 2018-08-03 DIAGNOSIS — M17.12 PRIMARY OSTEOARTHRITIS OF LEFT KNEE: Primary | ICD-10-CM

## 2018-08-03 PROCEDURE — 20610 DRAIN/INJ JOINT/BURSA W/O US: CPT | Performed by: PHYSICIAN ASSISTANT

## 2018-08-03 RX ORDER — METHYLPREDNISOLONE 4 MG/1
TABLET ORAL
Qty: 21 TABLET | Refills: 0 | Status: SHIPPED | OUTPATIENT
Start: 2018-08-03

## 2018-08-03 NOTE — PROGRESS NOTES
Assessment/Plan:  1  Primary osteoarthritis of left knee      Orders Placed This Encounter   Procedures    Large joint arthrocentesis     -patient received a left knee Synvisc injection today  She should avoid strenuous activities rest ice her knee for 1-2 days   -continue with home exercises  -continue with Tylenol as needed for pain control  -she is given a Medrol Dosepak to use only at her granddaughter's wedding in 2 weeks if needed  Medication side effects warnings were given to her  Return in about 2 months (around 10/3/2018)  Subjective   Chief Complaint:   Chief Complaint   Patient presents with    Left Knee - Follow-up       Marisela Horton is a 81 y o  female who presents for follow up for left knee pain today  She had a steroid injection on May 16th which only helped for few weeks  Her knee pain is medial and lateral   She also has some posterior knee pain  Her knee is unstable to her  She has been doing home exercises which are helping  She does take Tylenol as needed for pain control  Her PCP recently just gave her some narcotics due to her knee flaring up yesterday She has not taken any  She does have a knee brace which is helpful  She is here today for Synvisc injection    Review of Systems  ROS:    See HPI for musculoskeletal review     All other systems reviewed are negative     History:  Past Medical History:   Diagnosis Date    Hypertension      Past Surgical History:   Procedure Laterality Date    FOOT SURGERY      SKIN CANCER EXCISION       Social History   History   Alcohol use Not on file     History   Drug use: Unknown     History   Smoking Status    Never Smoker   Smokeless Tobacco    Never Used     Family History:   Family History   Problem Relation Age of Onset    No Known Problems Mother     No Known Problems Father        Meds/Allergies     (Not in a hospital admission)  No Known Allergies       Objective     /76   Pulse 68   Ht 4' 11" (1 499 m)   Wt 67 6 kg (149 lb)   BMI 30 09 kg/m²      PE:  AAOx 3  WDWN  Hearing intact, no drainage from eyes  no audible wheezing  no abdominal distension  LE compartments soft, skin intact    Ortho Exam:  left Knee:   No erythema  no swelling  no effusion  no warmth  AROM: full  Stable to varus/valgus stress  Large joint arthrocentesis  Date/Time: 8/3/2018 12:42 PM  Consent given by: patient  Site marked: site marked  Timeout: Immediately prior to procedure a time out was called to verify the correct patient, procedure, equipment, support staff and site/side marked as required   Supporting Documentation  Indications: pain   Procedure Details  Location: knee - L knee  Needle size: 22 G  Approach: anterolateral  Medications administered: 48 mg hylan 48 MG/6ML    Patient tolerance: patient tolerated the procedure well with no immediate complications  Dressing:  Sterile dressing applied

## 2018-10-05 ENCOUNTER — APPOINTMENT (OUTPATIENT)
Dept: RADIOLOGY | Facility: CLINIC | Age: 83
End: 2018-10-05
Payer: COMMERCIAL

## 2018-10-05 ENCOUNTER — OFFICE VISIT (OUTPATIENT)
Dept: OBGYN CLINIC | Facility: MEDICAL CENTER | Age: 83
End: 2018-10-05
Payer: COMMERCIAL

## 2018-10-05 VITALS — HEIGHT: 59 IN | BODY MASS INDEX: 30.52 KG/M2 | WEIGHT: 151.4 LBS

## 2018-10-05 DIAGNOSIS — M25.561 RIGHT KNEE PAIN, UNSPECIFIED CHRONICITY: Primary | ICD-10-CM

## 2018-10-05 DIAGNOSIS — M25.561 RIGHT KNEE PAIN, UNSPECIFIED CHRONICITY: ICD-10-CM

## 2018-10-05 DIAGNOSIS — M17.11 PRIMARY OSTEOARTHRITIS OF RIGHT KNEE: ICD-10-CM

## 2018-10-05 DIAGNOSIS — M17.12 PRIMARY OSTEOARTHRITIS OF LEFT KNEE: ICD-10-CM

## 2018-10-05 PROCEDURE — 99213 OFFICE O/P EST LOW 20 MIN: CPT | Performed by: ORTHOPAEDIC SURGERY

## 2018-10-05 PROCEDURE — 73564 X-RAY EXAM KNEE 4 OR MORE: CPT

## 2018-10-05 PROCEDURE — 20610 DRAIN/INJ JOINT/BURSA W/O US: CPT | Performed by: ORTHOPAEDIC SURGERY

## 2018-10-05 RX ORDER — METHYLPREDNISOLONE ACETATE 40 MG/ML
2 INJECTION, SUSPENSION INTRA-ARTICULAR; INTRALESIONAL; INTRAMUSCULAR; SOFT TISSUE
Status: COMPLETED | OUTPATIENT
Start: 2018-10-05 | End: 2018-10-05

## 2018-10-05 RX ORDER — BUPIVACAINE HYDROCHLORIDE 2.5 MG/ML
2 INJECTION, SOLUTION INFILTRATION; PERINEURAL
Status: COMPLETED | OUTPATIENT
Start: 2018-10-05 | End: 2018-10-05

## 2018-10-05 RX ADMIN — BUPIVACAINE HYDROCHLORIDE 2 ML: 2.5 INJECTION, SOLUTION INFILTRATION; PERINEURAL at 09:00

## 2018-10-05 RX ADMIN — METHYLPREDNISOLONE ACETATE 2 ML: 40 INJECTION, SUSPENSION INTRA-ARTICULAR; INTRALESIONAL; INTRAMUSCULAR; SOFT TISSUE at 08:59

## 2018-10-05 RX ADMIN — METHYLPREDNISOLONE ACETATE 2 ML: 40 INJECTION, SUSPENSION INTRA-ARTICULAR; INTRALESIONAL; INTRAMUSCULAR; SOFT TISSUE at 09:00

## 2018-10-05 RX ADMIN — BUPIVACAINE HYDROCHLORIDE 2 ML: 2.5 INJECTION, SOLUTION INFILTRATION; PERINEURAL at 08:59

## 2018-10-05 NOTE — PROGRESS NOTES
Assessment/Plan     1  Right knee pain, unspecified chronicity    2  Primary osteoarthritis of right knee    3  Primary osteoarthritis of left knee      Orders Placed This Encounter   Procedures    Large joint arthrocentesis    Large joint arthrocentesis    XR knee 4+ vw right injury   -patient received bilateral knee steroid injections today  She should avoid strenuous activities rest ice her knee  -we will work on ordering Synvisc for her right knee today   -continue with home exercises  -continue Tylenol as needed for pain control    Return for for visco right knee after 12/5  History of Present Illness   Chief complaint:   Chief Complaint   Patient presents with    Left Knee - Pain    Right Knee - Pain       HPI: Herminio Dunham is a 81 y o  female that c/o bilateral knee pain  She had a Synvisc injection her left knee on 08/03  She states this is currently still helping her pain  She occasionally gets some pain on her anterior knee cap and her knee cracks  Her left knee does feel stable to her  Her right knee is now bothering her  Her right knee pain is posterior and medial   The pain is there most of the time  It is described as sharp stabbing pain  Her right knee does feel unstable  She has been taking Tylenol as needed for pain control  She has had no falls traumas or injuries  She has not had any injections or surgeries on her right knee  ROS:    See HPI for musculoskeletal review     All other systems reviewed are negative     Historical Information   Past Medical History:   Diagnosis Date    Hypertension      Past Surgical History:   Procedure Laterality Date    FOOT SURGERY      SKIN CANCER EXCISION       Social History   History   Alcohol use Not on file     History   Drug use: Unknown     History   Smoking Status    Never Smoker   Smokeless Tobacco    Never Used     Family History:   Family History   Problem Relation Age of Onset    No Known Problems Mother     No Known Problems Father        Meds/Allergies     (Not in a hospital admission)  No Known Allergies    Objective   Vitals: Height 4' 11" (1 499 m), weight 68 7 kg (151 lb 6 4 oz)  ,Body mass index is 30 58 kg/m²      PE:  AAOx 3  WDWN  Hearing intact, no drainage from eyes  Regular rate  no audible wheezing  no abdominal distension  LE compartments soft, skin intact  EHL/AT/GS intact, sensation grossly intact L4, L5, S1, palpable pedal pulse    bilateralknee:    Appearance:  no swelling   No bruising  no obvious joint deformity   No effusion  Palpation/Tenderness:  +TTP over medial joint line, +TTP over lateral joint line or over patella/patellar tendon  Active Range of Motion:  AROM: full  Special Tests: Right knee   Medial Yarelis's Test:  Positive  Lateral Yarelis's Test:  Negative  Apley's compression test:  Negative  Lachman's Test:  negative  Anterior Drawer Test:  negative  Valgus Stress Test:  negative  Varus Stress Test:  negative     No ipsilateral hip pain with ROM    Imaging Studies: I have personally reviewed pertinent films in PACS  X-rays right knee- severe DJD   Large joint arthrocentesis  Date/Time: 10/5/2018 8:59 AM  Consent given by: patient  Site marked: site marked  Timeout: Immediately prior to procedure a time out was called to verify the correct patient, procedure, equipment, support staff and site/side marked as required   Supporting Documentation  Indications: pain   Procedure Details  Location: knee - R knee  Needle size: 22 G  Approach: anterolateral  Medications administered: 2 mL bupivacaine 0 25 %; 2 mL methylPREDNISolone acetate 40 mg/mL    Patient tolerance: patient tolerated the procedure well with no immediate complications  Dressing:  Sterile dressing applied  Large joint arthrocentesis  Date/Time: 10/5/2018 9:00 AM  Consent given by: patient  Site marked: site marked  Timeout: Immediately prior to procedure a time out was called to verify the correct patient, procedure, equipment, support staff and site/side marked as required   Supporting Documentation  Indications: pain   Procedure Details  Location: knee - L knee  Preparation: Patient was prepped and draped in the usual sterile fashion  Needle size: 22 G  Approach: anterolateral  Medications administered: 2 mL bupivacaine 0 25 %; 2 mL methylPREDNISolone acetate 40 mg/mL    Patient tolerance: patient tolerated the procedure well with no immediate complications  Dressing:  Sterile dressing applied

## 2018-11-19 ENCOUNTER — TELEPHONE (OUTPATIENT)
Dept: OBGYN CLINIC | Facility: OTHER | Age: 83
End: 2018-11-19

## 2018-12-03 ENCOUNTER — OFFICE VISIT (OUTPATIENT)
Dept: OBGYN CLINIC | Facility: MEDICAL CENTER | Age: 83
End: 2018-12-03
Payer: COMMERCIAL

## 2018-12-03 VITALS
HEART RATE: 71 BPM | HEIGHT: 59 IN | SYSTOLIC BLOOD PRESSURE: 212 MMHG | BODY MASS INDEX: 29.84 KG/M2 | WEIGHT: 148 LBS | DIASTOLIC BLOOD PRESSURE: 79 MMHG

## 2018-12-03 DIAGNOSIS — M17.0 PRIMARY OSTEOARTHRITIS OF BOTH KNEES: Primary | ICD-10-CM

## 2018-12-03 PROBLEM — M17.10 PRIMARY OSTEOARTHRITIS OF KNEE: Status: ACTIVE | Noted: 2018-12-03

## 2018-12-03 PROCEDURE — 20610 DRAIN/INJ JOINT/BURSA W/O US: CPT | Performed by: ORTHOPAEDIC SURGERY

## 2018-12-03 NOTE — PROGRESS NOTES
Assessment/Plan:  1  Primary osteoarthritis of both knees      Orders Placed This Encounter   Procedures    Large joint arthrocentesis   ·  Patient received rate Synvisc-One injection today  She was advised to avoid strenuous activity, rest, ice as needed for the next 1-2 days  · Continue with Tylenol as needed for pain control  · Continue home exercises    Return in about 2 months (around 2/3/2019), or if symptoms worsen or fail to improve  Subjective   Chief Complaint:   Chief Complaint   Patient presents with    Right Knee - Pain, Follow-up       Nicole Celis is a 81 y o  female who presents for follow up for right knee pain  She last received a CSI in the right knee on 10/05, which she said operative minimal pain relief  She continues to describe the pain as sharp and stabbing  She takes Tylenol as needed for pain and completes home exercises daily  Denies any falls or trauma since her last visit  She states her right knee feels unstable at times  She had a Synvisc-One injection in her left knee on 8 3 which she states is significantly improved her left knee pain  Review of Systems  ROS:    See HPI for musculoskeletal review     All other systems reviewed are negative     History:  Past Medical History:   Diagnosis Date    Hypertension      Past Surgical History:   Procedure Laterality Date    FOOT SURGERY      SKIN CANCER EXCISION       Social History   History   Alcohol use Not on file     History   Drug use: Unknown     History   Smoking Status    Never Smoker   Smokeless Tobacco    Never Used     Family History:   Family History   Problem Relation Age of Onset    No Known Problems Mother     No Known Problems Father        Meds/Allergies     (Not in a hospital admission)  No Known Allergies       Objective     BP (!) 212/79   Pulse 71   Ht 4' 11" (1 499 m)   Wt 67 1 kg (148 lb)   BMI 29 89 kg/m²      PE:  AAOx 3  WDWN  Hearing intact, no drainage from eyes  no audible wheezing  no abdominal distension  LE compartments soft, skin intact    Ortho Exam:  right Knee:   No erythema  no swelling  no effusion  no warmth  AROM: full  Stable to varus/valgus stress    Large joint arthrocentesis  Date/Time: 12/3/2018 2:39 PM  Consent given by: patient  Site marked: site marked  Timeout: Immediately prior to procedure a time out was called to verify the correct patient, procedure, equipment, support staff and site/side marked as required   Supporting Documentation  Indications: pain   Procedure Details  Location: knee - R knee  Preparation: Patient was prepped and draped in the usual sterile fashion  Needle size: 22 G  Ultrasound guidance: no  Approach: anterolateral  Medications administered: 48 mg hylan 48 MG/6ML    Patient tolerance: patient tolerated the procedure well with no immediate complications  Dressing:  Sterile dressing applied

## 2019-04-17 ENCOUNTER — OFFICE VISIT (OUTPATIENT)
Dept: OBGYN CLINIC | Facility: MEDICAL CENTER | Age: 84
End: 2019-04-17
Payer: COMMERCIAL

## 2019-04-17 VITALS
WEIGHT: 157.6 LBS | BODY MASS INDEX: 31.77 KG/M2 | SYSTOLIC BLOOD PRESSURE: 134 MMHG | DIASTOLIC BLOOD PRESSURE: 68 MMHG | HEIGHT: 59 IN | HEART RATE: 64 BPM

## 2019-04-17 DIAGNOSIS — M17.11 PRIMARY OSTEOARTHRITIS OF RIGHT KNEE: ICD-10-CM

## 2019-04-17 DIAGNOSIS — M17.12 PRIMARY OSTEOARTHRITIS OF LEFT KNEE: Primary | ICD-10-CM

## 2019-04-17 PROCEDURE — 99213 OFFICE O/P EST LOW 20 MIN: CPT | Performed by: ORTHOPAEDIC SURGERY

## 2019-04-17 PROCEDURE — 20610 DRAIN/INJ JOINT/BURSA W/O US: CPT | Performed by: PHYSICIAN ASSISTANT

## 2019-04-17 RX ORDER — MULTIVIT-MIN/IRON FUM/FOLIC AC 7.5 MG-4
1 TABLET ORAL DAILY
COMMUNITY

## 2019-04-17 RX ORDER — LIDOCAINE HYDROCHLORIDE 10 MG/ML
3 INJECTION, SOLUTION INFILTRATION; PERINEURAL
Status: COMPLETED | OUTPATIENT
Start: 2019-04-17 | End: 2019-04-17

## 2019-04-17 RX ORDER — LOSARTAN POTASSIUM 100 MG/1
100 TABLET ORAL DAILY
COMMUNITY

## 2019-04-17 RX ORDER — CHLORAL HYDRATE 500 MG
1000 CAPSULE ORAL DAILY
COMMUNITY

## 2019-04-17 RX ORDER — METHYLPREDNISOLONE ACETATE 40 MG/ML
2 INJECTION, SUSPENSION INTRA-ARTICULAR; INTRALESIONAL; INTRAMUSCULAR; SOFT TISSUE
Status: COMPLETED | OUTPATIENT
Start: 2019-04-17 | End: 2019-04-17

## 2019-04-17 RX ORDER — AMLODIPINE BESYLATE AND ATORVASTATIN CALCIUM 5; 10 MG/1; MG/1
1 TABLET, FILM COATED ORAL DAILY
COMMUNITY

## 2019-04-17 RX ORDER — MELATONIN
2000 DAILY
COMMUNITY

## 2019-04-17 RX ORDER — FUROSEMIDE 40 MG/1
40 TABLET ORAL 2 TIMES DAILY
COMMUNITY

## 2019-04-17 RX ORDER — POTASSIUM CHLORIDE 600 MG/1
8 TABLET, FILM COATED, EXTENDED RELEASE ORAL 2 TIMES DAILY
COMMUNITY

## 2019-04-17 RX ORDER — LABETALOL 100 MG/1
100 TABLET, FILM COATED ORAL 2 TIMES DAILY
COMMUNITY

## 2019-04-17 RX ADMIN — METHYLPREDNISOLONE ACETATE 2 ML: 40 INJECTION, SUSPENSION INTRA-ARTICULAR; INTRALESIONAL; INTRAMUSCULAR; SOFT TISSUE at 13:13

## 2019-04-17 RX ADMIN — LIDOCAINE HYDROCHLORIDE 3 ML: 10 INJECTION, SOLUTION INFILTRATION; PERINEURAL at 13:13

## 2019-06-03 ENCOUNTER — TELEPHONE (OUTPATIENT)
Dept: OBGYN CLINIC | Facility: HOSPITAL | Age: 84
End: 2019-06-03

## 2019-06-03 NOTE — TELEPHONE ENCOUNTER
TO BE COMPLETED BY CENTRAL AUTH TEAM:     Physician:    [facility]    Medication: SYNVISC-ONE    Number of Injections in Series (Appointments scheduled 1 week apart from one another): 1    Schedule after this date: 6/17/19    Billing Info: Buy and Bill/Specialty Pharmacy-Patient Supply>> BUY&BILL    Appointment Message Line: LEFT KNEE SYNVISC-ONE BUY&BILL  (please copy and paste appointment message line when scheduling appointment)    Additional Comments:

## 2019-06-04 NOTE — TELEPHONE ENCOUNTER
TO BE COMPLETED BY :     Office location appointment scheduled: [email or web address]    Date of First Appointment scheduled: 6/19/19 @ 10:45    Additional Comments:

## 2019-06-19 ENCOUNTER — OFFICE VISIT (OUTPATIENT)
Dept: OBGYN CLINIC | Facility: MEDICAL CENTER | Age: 84
End: 2019-06-19
Payer: COMMERCIAL

## 2019-06-19 VITALS
WEIGHT: 146.4 LBS | HEART RATE: 78 BPM | BODY MASS INDEX: 29.52 KG/M2 | SYSTOLIC BLOOD PRESSURE: 130 MMHG | HEIGHT: 59 IN | DIASTOLIC BLOOD PRESSURE: 78 MMHG

## 2019-06-19 DIAGNOSIS — M17.11 PRIMARY OSTEOARTHRITIS OF RIGHT KNEE: Primary | ICD-10-CM

## 2019-06-19 DIAGNOSIS — M17.12 OSTEOARTHRITIS OF LEFT KNEE, UNSPECIFIED OSTEOARTHRITIS TYPE: ICD-10-CM

## 2019-06-19 DIAGNOSIS — M25.562 LEFT KNEE PAIN, UNSPECIFIED CHRONICITY: ICD-10-CM

## 2019-06-19 PROCEDURE — 20610 DRAIN/INJ JOINT/BURSA W/O US: CPT | Performed by: ORTHOPAEDIC SURGERY

## 2019-09-17 ENCOUNTER — TELEPHONE (OUTPATIENT)
Dept: OBGYN CLINIC | Facility: HOSPITAL | Age: 84
End: 2019-09-17

## 2019-09-17 NOTE — TELEPHONE ENCOUNTER
TO BE COMPLETED BY CENTRAL AUTH TEAM:     Physician: DR Daniella Alexander    Medication: SYNVISC ONE    Number of Injections in Series (Appointments scheduled 1 week apart from one another): 1    Schedule after this date: 9/24/19    Billing Info: Buy and Bill/Specialty Pharmacy-Patient Supply>> BUY & BILL    Appointment Message Line:  (please copy and paste appointment message line when scheduling appointment) RIGHT KNEE [address]    Additional Comments: